# Patient Record
Sex: MALE | Race: WHITE
[De-identification: names, ages, dates, MRNs, and addresses within clinical notes are randomized per-mention and may not be internally consistent; named-entity substitution may affect disease eponyms.]

---

## 2020-02-02 ENCOUNTER — HOSPITAL ENCOUNTER (EMERGENCY)
Dept: HOSPITAL 46 - ED | Age: 40
Discharge: HOME | End: 2020-02-02
Payer: COMMERCIAL

## 2020-02-02 VITALS — WEIGHT: 275 LBS | BODY MASS INDEX: 35.29 KG/M2 | HEIGHT: 74 IN

## 2020-02-02 DIAGNOSIS — Z88.6: ICD-10-CM

## 2020-02-02 DIAGNOSIS — S80.11XA: Primary | ICD-10-CM

## 2020-02-02 DIAGNOSIS — J45.909: ICD-10-CM

## 2020-02-02 DIAGNOSIS — I10: ICD-10-CM

## 2020-02-02 DIAGNOSIS — Z79.899: ICD-10-CM
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
## 2020-02-02 DIAGNOSIS — F17.200: ICD-10-CM

## 2020-02-02 NOTE — XMS
PreManage Notification: BONNY VAUGHAN MRN:[id number]
 
Security Information
 
Security Events
No recent Security Events currently on file
 
 
 
CRITERIA MET
------------
- Group Notification
- [facility] - Has Care Guidelines
 
 
CARE PROVIDERS
-------------------------------------------------------------------------------------
ASHLEY CHIANG     Nurse Practitioner: Family     08/06/2018-Current
 
PHONE: Unknown
-------------------------------------------------------------------------------------
NICHOLAS MOORE     Primary Care     Current
 
PHONE: [phone]
-------------------------------------------------------------------------------------
 
[facility] has no Care Guidelines for this patient.
Care History
Medical/Surgical
08/06/2018    [facility]
 
      - Patient is currently established with [facility]. If patient is seen 
      in the ED during business hours. Please contact CHWs at [facility].
      Care Recommendation: This patient has had 5 or more Emergency Department
      visits in the last 12 months.\T\nbsp; Patient requires education on the scope
      and purpose of the ED as an acute care provider not a Primary Care Provider
      and should not be utilized for chronic conditions.\T\nbsp; These are
      guidelines and the provider should exercise clinical judgment when providing
      care.
05/24/2018    [facility]
HX: ASTHMA / SMOKING / DVT / HTN / DM2 / HYPERCHOLESTEROLEMIA PATIENT TREATED 
      CHRONICALLY WITH COUMADIN
E.DKennedy VISIT COUNT (12 MO.)
-------------------------------------------------------------------------------------
1 [facility]
-------------------------------------------------------------------------------------
TOTAL 1
-------------------------------------------------------------------------------------
NOTE: Visits indicate total known visits.
 
ED/UCC VISIT TRACKING (12 MO.)
-------------------------------------------------------------------------------------
02/02/2020 13:19
[facility] [place]
 
TYPE: Emergency
 
COMPLAINT:
- RIGHT LEG PAIN
 
-------------------------------------------------------------------------------------
 
 
INPATIENT VISIT TRACKING (12 MO.)
No inpatient visits to display in this time frame
 
[email or web address]

## 2020-06-07 ENCOUNTER — HOSPITAL ENCOUNTER (EMERGENCY)
Dept: HOSPITAL 46 - ED | Age: 40
LOS: 1 days | Discharge: HOME | End: 2020-06-08
Payer: COMMERCIAL

## 2020-06-07 VITALS — HEIGHT: 74 IN | BODY MASS INDEX: 35.94 KG/M2 | WEIGHT: 280.01 LBS

## 2020-06-07 DIAGNOSIS — F17.200: ICD-10-CM

## 2020-06-07 DIAGNOSIS — Z88.6: ICD-10-CM

## 2020-06-07 DIAGNOSIS — I10: ICD-10-CM

## 2020-06-07 DIAGNOSIS — J45.909: ICD-10-CM

## 2020-06-07 DIAGNOSIS — Z79.899: ICD-10-CM

## 2020-06-07 DIAGNOSIS — R07.89: Primary | ICD-10-CM

## 2020-06-07 NOTE — XMS
PreManage Notification: BONNY VAUGHAN MRN:[id number]
 
Security Information
 
Security Events
No recent Security Events currently on file
 
 
 
CRITERIA MET
------------
- Group Notification
 
 
CARE PROVIDERS
-------------------------------------------------------------------------------------
CECILIA JOHN      Physician Assistant     02/03/2020-Current
 
PHONE: Unknown
-------------------------------------------------------------------------------------
ASHLEY CHIANG      Nurse Practitioner: Family     08/06/2018-Current
 
PHONE: [phone]
-------------------------------------------------------------------------------------
 
[facility] has no Care Guidelines for this patient.
Care History
Medical/Surgical
05/24/2018    [facility]
HX: ASTHMA / SMOKING / DVT / HTN / DM2 / HYPERCHOLESTEROLEMIA PATIENT TREATED 
      CHRONICALLY WITH COUMADIN
E.DKennedy VISIT COUNT (12 MO.)
-------------------------------------------------------------------------------------
2 [facility]
-------------------------------------------------------------------------------------
TOTAL 2
-------------------------------------------------------------------------------------
NOTE: Visits indicate total known visits.
 
ED/UCC VISIT TRACKING (12 MO.)
-------------------------------------------------------------------------------------
06/07/2020 23:07
[facility] [place]
 
TYPE: Emergency
 
COMPLAINT:
- CHEST PAIN
-------------------------------------------------------------------------------------
02/02/2020 13:19
[facility] [place]
 
TYPE: Emergency
 
COMPLAINT:
- RIGHT LEG PAIN
 
DIAGNOSES:
- Contusion of right lower leg, initial encounter
 
- Allergy status to analgesic agent status
- Nicotine dependence, unspecified, uncomplicated
- Other long term (current) drug therapy
- Pain in right lower leg
- Unspecified asthma, uncomplicated
- Civilian activity done for income or pay
- Striking against or struck by other objects, initial encounte
- Essential (primary) hypertension
-------------------------------------------------------------------------------------
 
 
INPATIENT VISIT TRACKING (12 MO.)
No inpatient visits to display in this time frame
 
[email or web address]

## 2020-06-08 NOTE — EKG
[facility]
                                    [address]
                                  [place], [place]  [address]
_________________________________________________________________________________________
                                                                 Signed   
 
 
Sinus rhythm with short [place]
Otherwise normal ECG
No previous ECGs available
Confirmed by LIV RIVERA MD ([id number]) on 6/8/2020 7:02:29 AM
 
 
 
 
 
 
 
 
 
 
 
 
 
 
 
 
 
 
 
 
 
 
 
 
 
 
 
 
 
 
 
 
 
 
 
 
 
 
 
 
 
    Electronically Signed By: LIV RIVERA MD  06/08/20 0702
_________________________________________________________________________________________
PATIENT NAME:     BONNY VAUGHAN                         
MEDICAL RECORD #: [id number]                     Electrocardiogram             
          ACCT #: [id number]  
DATE OF BIRTH:   [birth year]                                       
PHYSICIAN:   LIV RIVERA MD                   REPORT #: [id number]
REPORT IS CONFIDENTIAL AND NOT TO BE RELEASED WITHOUT AUTHORIZATION

## 2021-06-15 ENCOUNTER — HOSPITAL ENCOUNTER (EMERGENCY)
Dept: HOSPITAL 46 - ED | Age: 41
Discharge: HOME | End: 2021-06-15
Payer: COMMERCIAL

## 2021-06-15 VITALS — WEIGHT: 297.62 LBS | HEIGHT: 74 IN | BODY MASS INDEX: 38.2 KG/M2

## 2021-06-15 DIAGNOSIS — F17.200: ICD-10-CM

## 2021-06-15 DIAGNOSIS — J45.909: ICD-10-CM

## 2021-06-15 DIAGNOSIS — M54.6: Primary | ICD-10-CM

## 2021-06-15 DIAGNOSIS — Z88.6: ICD-10-CM

## 2021-06-15 DIAGNOSIS — Z79.899: ICD-10-CM

## 2021-06-15 DIAGNOSIS — R51.9: ICD-10-CM

## 2021-06-15 DIAGNOSIS — E11.9: ICD-10-CM

## 2021-06-15 DIAGNOSIS — I10: ICD-10-CM

## 2021-06-15 DIAGNOSIS — Z20.822: ICD-10-CM

## 2021-06-15 DIAGNOSIS — M79.10: ICD-10-CM

## 2021-06-15 PROCEDURE — C9803 HOPD COVID-19 SPEC COLLECT: HCPCS

## 2021-06-15 PROCEDURE — U0003 INFECTIOUS AGENT DETECTION BY NUCLEIC ACID (DNA OR RNA); SEVERE ACUTE RESPIRATORY SYNDROME CORONAVIRUS 2 (SARS-COV-2) (CORONAVIRUS DISEASE [COVID-19]), AMPLIFIED PROBE TECHNIQUE, MAKING USE OF HIGH THROUGHPUT TECHNOLOGIES AS DESCRIBED BY CMS-2020-01-R: HCPCS

## 2021-06-15 NOTE — XMS
PreManage Notification: BONNY VAUGHAN MRN:[id number]
 
Security Information
 
Security Events
No recent Security Events currently on file
 
 
 
CRITERIA MET
------------
- Group Notification
 
 
CARE PROVIDERS
-------------------------------------------------------------------------------------
CECILIA JOHN      Physician Assistant     02/03/2020-Current
 
PHONE: Unknown
-------------------------------------------------------------------------------------
ASHLEY CHIANG      Nurse Practitioner: Family     08/06/2018-Current
 
PHONE: [phone]
-------------------------------------------------------------------------------------
 
[facility] has no Care Guidelines for this patient.
Care History
Medical/Surgical
05/24/2018    [facility] [place] [facility]
HX: ASTHMA / SMOKING / DVT / HTN / DM2 / HYPERCHOLESTEROLEMIA PATIENT TREATED 
      CHRONICALLY WITH COUMADIN
E.DKennedy VISIT COUNT (12 MO.)
-------------------------------------------------------------------------------------
1 [facility]
-------------------------------------------------------------------------------------
TOTAL 1
-------------------------------------------------------------------------------------
NOTE: Visits indicate total known visits.
 
ED/UCC VISIT TRACKING (12 MO.)
-------------------------------------------------------------------------------------
06/15/2021 06:16
[facility] [place]
 
TYPE: Emergency
 
COMPLAINT:
- BACK PAIN, HEADACHE, WEAKNESS, ARMS/HANDS NUMBNESS
-------------------------------------------------------------------------------------
 
 
INPATIENT VISIT TRACKING (12 MO.)
No inpatient visits to display in this time frame
 
[email or web address]

## 2021-06-15 NOTE — EKG
[facility]
                                    [address]
                                  [place], [place]  [address]
_________________________________________________________________________________________
                                                                 Signed   
 
 
Normal sinus rhythm
Incomplete right bundle branch block
Borderline ECG
When compared with ECG of 07-JUN-2020 23:14,
No significant change was found
Confirmed by YESENIA FAIR DO ([id number]) on 6/15/2021 8:20:38 AM
 
 
 
 
 
 
 
 
 
 
 
 
 
 
 
 
 
 
 
 
 
 
 
 
 
 
 
 
 
 
 
 
 
 
 
 
 
 
 
    Electronically Signed By: YESENIA FAIR DO  06/15/21 0820
_________________________________________________________________________________________
PATIENT NAME:     BONNY VAUGHAN                         
MEDICAL RECORD #: [id number]                     Electrocardiogram             
          ACCT #: [id number]  
DATE OF BIRTH:   [birth year]                                       
PHYSICIAN:   YESENIA FAIR DO                     REPORT #: [id number]
REPORT IS CONFIDENTIAL AND NOT TO BE RELEASED WITHOUT AUTHORIZATION

## 2022-12-02 ENCOUNTER — HOSPITAL ENCOUNTER (OUTPATIENT)
Dept: HOSPITAL 46 - OPS | Age: 42
Discharge: HOME | End: 2022-12-02
Attending: SURGERY
Payer: COMMERCIAL

## 2022-12-02 VITALS — HEIGHT: 75 IN | BODY MASS INDEX: 35.5 KG/M2 | WEIGHT: 285.5 LBS

## 2022-12-02 DIAGNOSIS — J45.909: ICD-10-CM

## 2022-12-02 DIAGNOSIS — K29.50: ICD-10-CM

## 2022-12-02 DIAGNOSIS — E11.9: ICD-10-CM

## 2022-12-02 DIAGNOSIS — Z79.84: ICD-10-CM

## 2022-12-02 DIAGNOSIS — E66.01: ICD-10-CM

## 2022-12-02 DIAGNOSIS — R13.10: Primary | ICD-10-CM

## 2022-12-02 DIAGNOSIS — Z79.82: ICD-10-CM

## 2022-12-02 DIAGNOSIS — E78.00: ICD-10-CM

## 2022-12-02 DIAGNOSIS — K44.9: ICD-10-CM

## 2022-12-02 DIAGNOSIS — K21.9: ICD-10-CM

## 2022-12-02 PROCEDURE — 0DB28ZX EXCISION OF MIDDLE ESOPHAGUS, VIA NATURAL OR ARTIFICIAL OPENING ENDOSCOPIC, DIAGNOSTIC: ICD-10-PCS | Performed by: SURGERY

## 2022-12-02 PROCEDURE — 0D758ZZ DILATION OF ESOPHAGUS, VIA NATURAL OR ARTIFICIAL OPENING ENDOSCOPIC: ICD-10-PCS | Performed by: SURGERY

## 2022-12-02 PROCEDURE — 0DB68ZX EXCISION OF STOMACH, VIA NATURAL OR ARTIFICIAL OPENING ENDOSCOPIC, DIAGNOSTIC: ICD-10-PCS | Performed by: SURGERY

## 2022-12-02 PROCEDURE — 0DB98ZX EXCISION OF DUODENUM, VIA NATURAL OR ARTIFICIAL OPENING ENDOSCOPIC, DIAGNOSTIC: ICD-10-PCS | Performed by: SURGERY

## 2022-12-02 NOTE — NUR
12/02/22 Sowmya Faye
1056 PT ARRIVED TO PACU ON 6L VIA MASK. PT WAKES AND DENIES CONCERNS.
 
1059 O2 REMOVED.
 
1105 PT SITTING UP IN BED AND HIB INCREASED.
 
1113 MD AT BEDSIDE TALKING TO PT.

## 2022-12-03 NOTE — OR
[facility]
                                    [address] [place], [place]  [address]
_________________________________________________________________________________________
                                                                 Signed   
 
 
DATE OF OPERATION:
12/02/2022
 
SURGEON:
Micaela Crystal MD
 
PREOPERATIVE DIAGNOSES:
1. Proximal and mid esophageal dysphagia.
2. Question of small narrowing in distal esophagus on barium swallow.
3. Small hiatal hernia.
4. History of diarrhea.
 
POSTOPERATIVE DIAGNOSES:
1. Small to moderate sized hiatal hernia (50-44 cm).
2. GE junction at 44 cm.
3. Mild diffuse gastritis.
 
PROCEDURES:
1. EGD with CLOtest and biopsies of the duodenum, antrum, and midesophagus.
2. Esophageal dilation 54-Sierra [place]an with American dilator.
 
ESTIMATED BLOOD LOSS:
None.
 
INDICATIONS:
Ehsan is a 42-year-old diabetic gentleman, who was asked to see me for what sounds like
proximal and mid esophageal dysphagia to solid foods.  He had been working with his
primary care provider.  The barium swallow showed a small sliding hiatal hernia with a
small amount of acid reflux.  The radiologist thought he may have had some very mildly
decreased esophageal motility in the distal esophagus.  He seemed to have just a mild
narrowing in the distal esophagus on the barium swallow.  It took a little bit of time
for the 13 mm pill to pass down through the distal esophagus on his barium swallow.
Therefore, he was asked to see me for consideration of upper endoscopy with dilation.
There was also some question of chronic diarrhea.  However, he is on metformin for his
diabetes.  I had met with Ehsan and his wife in the office.  We had reviewed the above
findings.  I gave him our brochure on endoscopy.  We reviewed the nature of the test.
We reviewed the idea of balloon dilation versus a full-length dilation with an American
dilator over a wire.  He understands there is risk including, but not limited to gas
bloating, crampy abdominal pain, bleeding, perforation requiring surgery, and missed
diagnosis.  He is also aware that there is 24-hour pH testing esophageal manometry if
needed.  He understands expected intraop and postop course.  For these cases, we always
use monitored anesthesia care.  He had expressed understanding and wished to proceed. 
 
    Electronically Signed By: MICAELA CRYSTAL MD  12/03/22 0710
_________________________________________________________________________________________
PATIENT NAME:     EHSAN VAUGHAN                         
MEDICAL RECORD #: [id number]            OPERATIVE REPORT              
          ACCT #: [id number]  
DATE OF BIRTH:   [birth year]            REPORT #: [id number]      
PHYSICIAN:        MICAELA CRYSTAL MD             
PCP:              SHAKEEL BAL MD           
REPORT IS CONFIDENTIAL AND NOT TO BE RELEASED WITHOUT AUTHORIZATION
 
 
                                  [facility]
                                    [address] [place], [place]  [address]
_________________________________________________________________________________________
                                                                 Signed   
 
 
 
PROCEDURE NOTE:
Ehsan was taken into our endoscopy suite and placed in the supine semi-recumbent
position.  He held his Eliquis six days prior to the procedure.  A bite block was
utilized for the case.  The posterior oropharynx was anesthetized with Hurricaine spray.
 He was given monitored anesthesia care with propofol per our nurse anesthetist.  The
adult gastroscope was introduced and advanced easily down the esophagus and out into the
stomach.  We did not see or feel any strictures with the adult gastroscope.  He had just
a little buckling of the mucosa inside his hiatal hernia.  Maybe that is what the
radiologist saw on the barium swallow.  I suspect this is also where the pill held up
for just a bit.  The scope was then passed out into the duodenum.  We went ahead and
took a biopsy of the duodenum for pathologic review due to the history of diarrhea.
However, the duodenum and pyloric channel were unremarkable.  He had very mild
erythematous changes in his stomach.  We went and took a biopsy out of the antrum for
CLOtest as well as pathologic review.  Upon retroflexion of the scope, I can see a small
hiatal hernia.  The scope was withdrawn up through the area of the GE junction, which is
compliant without stricture.  Very minimal disruption to the Z-line.  There was no
Hood's mucosa.  There was no gastric or esophageal varices.  We measured out the
Z-line roughly 44 to 45 cm from his incisors.  His hiatal hernia seemed to measure from
50 cm back to 44 back to 45 cm and that seems a little more than we were able to
visualize and what we saw on the barium swallow.  His distal, middle and upper esophagus
were unremarkable.  We took a biopsy out of his mid esophagus after we dilated the
esophagus because of the history of dysphagia.  In the meantime, we went ahead and
passed our wire down the gastroscope out into the stomach.  We removed the gastroscope
along with the bite block.  We used our 54-Sierra Leonean American dilator and we passed it over
the wire with some lubrication.  It passed very nicely with very minimal resistance
whatsoever down his entire esophagus.  After this, the wire and the dilator were
removed.  We passed the adult gastroscope back through the bite block and down the
esophagus.  We did not see any break in the mucosa anywhere in the esophagus or his
hiatal hernia.  His stomach was unremarkable.  As we withdrew the scope, we went ahead
and took a single biopsy of the midesophagus for his history of esophageal dysphagia.
After this the gas had been suctioned out and the gastroscope removed.  Ehsan tolerated
the procedure quite well. 
 
RECOMMENDATIONS:
I will see Ehsan back in my office in 7 to 14 days to review his results.  If this does
not resolve his dysphagia, he will need advance testing. 
 
 
 
            ________________________________________
            Micaela Crystal MD 
 
    Electronically Signed By: MICAELA CRYSTAL MD  12/03/22 0710
_________________________________________________________________________________________
PATIENT NAME:     EHSAN VAUGHAN                         
MEDICAL RECORD #: [id number]            OPERATIVE REPORT              
          ACCT #: [id number]  
DATE OF BIRTH:   [birth year]            REPORT #: [id number]      
PHYSICIAN:        MICAELA CRYSTAL MD             
PCP:              SHAKEEL BAL MD           
REPORT IS CONFIDENTIAL AND NOT TO BE RELEASED WITHOUT AUTHORIZATION
 
 
                                  [address]  [address]
_________________________________________________________________________________________
                                                                 Signed   
 
 
 
 
ALB/MODL
Job #:  [id number]
DD:  12/02/2022 11:08:19
DT:  12/02/2022 11:55:25
 
cc:            MD Shakeel Vargas MD
 
 
Copies:  MICAELA CRYSTAL MD
~
 
 
 
 
 
 
 
 
 
 
 
 
 
 
 
 
 
 
 
 
 
 
 
 
 
 
 
 
 
    Electronically Signed By: MICAELA CRYSTAL MD  12/03/22 0710
_________________________________________________________________________________________
PATIENT NAME:     EHSAN VAUGHAN                         
MEDICAL RECORD #: [id number]            OPERATIVE REPORT              
          ACCT #: [id number]  
DATE OF BIRTH:   [birth year]            REPORT #: [id number]      
PHYSICIAN:        MICAELA CRYSTAL MD             
PCP:              SHAKEEL BAL MD           
REPORT IS CONFIDENTIAL AND NOT TO BE RELEASED WITHOUT AUTHORIZATION

## 2022-12-06 NOTE — PATH
[facility]
                                    [address] [place], [place]  [address]
_________________________________________________________________________________________
                                                                 Signed   
 
 
 
SPECIMEN(S): A DUODENAL BIOPSY
SPECIMEN(S): B ANTRUM/PYLORUS BIOPSY
SPECIMEN(S): C MID ESOPHAGEAL BIOPSY
 
SPECIMEN SOURCE:
A. DUODENAL BIOPSY
B. ANTRUM/PYLORUS BIOPSY
C. MID ESOPHAGEAL BIOPSY
 
CLINICAL HISTORY:
Pre: Esophageal dysphagia.  Postop: Hiatal hernia.
 
FINAL PATHOLOGIC DIAGNOSIS:
A. Duodenal biopsy:
-  Benign duodenal mucosa, negative for specific diagnostic abnormality.
B. Antrum / pylorus biopsy:
-  Diffuse mild chronic gastritis.
-  The Helicobacter pylori immunostain is positive for organisms.
C. Mid esophageal biopsy:
-  Benign esophageal mucosa, negative for increased epithelial eosinophils.
[facility]:C2NR
 
MICROSCOPIC EXAMINATION:
Histologic sections of all submitted blocks are examined by light microscopy.  
These findings, together with the gross examination, support the pathologic 
diagnosis. 
A Helicobacter pylori immunostain is performed with appropriate positive and 
negative controls on block (B1) and is positive for organisms. 
[facility]
 
GROSS DESCRIPTION:
Three specimens are received in three containers labeled with "AS."
A. The specimen, labeled "AS, 1," and designated on the requisition "duodenum 
biopsy," is received in formalin and consists of one fragment of pink-tan 
tissue (0.4 cm in greatest dimension).  The 
specimen is submitted entirely in cassette (A1).
B. The specimen, labeled "AS, 2," and designated on the requisition 
"antrum/pylorus biopsy," is received in formalin and consists of one fragment 
of pink-tan tissue (0.3 cm in greatest dimension). 
The specimen is submitted entirely in cassette (B1).
C. The specimen, labeled "AS, 3," and designated on the requisition "middle 
 
                                                                                    
_________________________________________________________________________________________
PATIENT NAME:     BONNY VAUGHAN                         
MEDICAL RECORD #: [id number]            PATHOLOGY                     
          ACCT #: [id number]       ACCESSION #: [id number]     
DATE OF BIRTH:   [birth year]            REPORT #: [id number]       
PHYSICIAN:        [facility]              
PCP:              MARY BAL MD           
REPORT IS CONFIDENTIAL AND NOT TO BE RELEASED WITHOUT AUTHORIZATION
 
 
                                  [facility]
                                    [address] [place], [place]  [address]
_________________________________________________________________________________________
                                                                 Signed   
 
 
esophagus biopsy," is received in formalin and consists of one fragment of 
white-tan tissue (0.6 cm in greatest dimension). 
The specimen is submitted entirely in cassette (C1).
AC (under the direct supervision of a pathologist)
 
The Gross Description was prepared using a voice recognition system. The report 
was reviewed for accuracy; however, sound-alike word errors, addition and/or 
deletions may occur. If there is any 
question about this report, please contact Client Services.
 
ADDITIONAL NOTES:
Immunohistochemical and/or in situ hybridization studies were performed on this 
case with the appropriate positive controls that react as expected.  This test 
was developed and its performance 
characteristics determined by [facility].  It has not been cleared or 
approved by the U.S. Food and Drug Administration.  The FDA has determined that 
such clearance or approval is not 
necessary.  This test is used for clinical purposes.  It should not be regarded 
as investigational or for research.  [facility] is certified under the 
Clinical Laboratory Improvement 
Amendments of 1988 (CLIA) as qualified to perform high complexity clinical 
laboratory testing.  This assay has not been validated for specimens that have 
been decalcified. 
 
PERFORMING LABORATORY:
The technical component was performed by [facility], [address] [place] [id number] (CLIA# [id number]).  Professional interpretation was 
performed by [facility], 
[address] [place] [address], [place] [address] (CLIA#: [id number]).
 
Diagnostician:  Barry Bautista MD
Pathologist
Electronically Signed 12/06/2022
 
 
Copies:                                
~
 
 
 
 
 
 
                                                                                    
_________________________________________________________________________________________
PATIENT NAME:     BONNY VAUGHAN                         
MEDICAL RECORD #: [id number]            PATHOLOGY                     
          ACCT #: [id number]       ACCESSION #: [id number]     
DATE OF BIRTH:   [birth year]            REPORT #: [id number]       
PHYSICIAN:        TAO MILLER              
PCP:              MARY BAL MD           
REPORT IS CONFIDENTIAL AND NOT TO BE RELEASED WITHOUT AUTHORIZATION